# Patient Record
Sex: FEMALE | Race: WHITE
[De-identification: names, ages, dates, MRNs, and addresses within clinical notes are randomized per-mention and may not be internally consistent; named-entity substitution may affect disease eponyms.]

---

## 2022-09-15 ENCOUNTER — HOSPITAL ENCOUNTER (OUTPATIENT)
Dept: HOSPITAL 47 - RADCTMAIN | Age: 47
Discharge: HOME | End: 2022-09-15
Attending: SURGERY
Payer: COMMERCIAL

## 2022-09-15 DIAGNOSIS — K57.30: ICD-10-CM

## 2022-09-15 DIAGNOSIS — K56.41: ICD-10-CM

## 2022-09-15 DIAGNOSIS — K80.20: Primary | ICD-10-CM

## 2022-09-15 PROCEDURE — 74177 CT ABD & PELVIS W/CONTRAST: CPT

## 2022-09-16 NOTE — CT
EXAMINATION TYPE: CT abdomen pelvis w con

 

DATE OF EXAM: 9/15/2022

 

COMPARISON: None

 

HISTORY: DVTRCLI OF LG INT W/O PERF

 

CT DLP: 453.2 mGycm

 

CONTRAST: 

CT scan of the abdomen and pelvis is performed with Oral Contrast and with IV Contrast, patient injec
lior with 100ml mL of Isovue 300.

 

FINDINGS: 

LUNG BASES-: No visible nodule.  No infiltrate. 

 

LIVER/GB:   Calcified gallstones identified.  No space occupying hepatic lesion. Biliary tree is of n
ormal caliber. 

 

PANCREAS:  No inflammation.  No distinct mass. 

 

SPLEEN:  No splenic enlargement.  No lesion seen. 

 

ADRENALS:  No nodule.  No thickening. 

 

KIDNEYS/BLADDER:  No hydronephrosis.  No nephrolithiasis. 4 cm simple cyst lower pole left kidney. Ur
inary bladder grossly unremarkable. 

 

BOWEL: There is nonvisualization of the appendix. No inflammatory process identified. Sigmoid diverti
culosis without diverticulitis. There is at least moderate fecal stasis.  Normal bowel caliber.  No i
nflammation.

 

GENITAL ORGANS:  No gross abnormality. 

 

LYMPH NODES:  No greater than 1cm abdominal or pelvic lymph nodes are appreciated.

 

AORTA: No significant abnormality. 

 

OSSEOUS STRUCTURES:  No significant abnormality is seen. 

 

OTHER:  No significant additional abnormality is seen. 

 

IMPRESSION: 

1. No acute inflammatory process identified.

2. There is at least moderate fecal stasis seen throughout the colon.

3. Sigmoid diverticulosis without diverticulitis.

4. Cholelithiasis.

## 2022-09-29 ENCOUNTER — HOSPITAL ENCOUNTER (OUTPATIENT)
Dept: HOSPITAL 47 - ORWHC2ENDO | Age: 47
Discharge: HOME | End: 2022-09-29
Attending: SURGERY
Payer: COMMERCIAL

## 2022-09-29 VITALS — SYSTOLIC BLOOD PRESSURE: 101 MMHG | HEART RATE: 70 BPM | DIASTOLIC BLOOD PRESSURE: 55 MMHG

## 2022-09-29 VITALS — TEMPERATURE: 97 F | RESPIRATION RATE: 16 BRPM

## 2022-09-29 VITALS — BODY MASS INDEX: 22.3 KG/M2

## 2022-09-29 DIAGNOSIS — K64.0: ICD-10-CM

## 2022-09-29 DIAGNOSIS — Z12.11: Primary | ICD-10-CM

## 2022-09-29 DIAGNOSIS — K57.30: ICD-10-CM

## 2022-09-29 DIAGNOSIS — Z87.891: ICD-10-CM

## 2022-09-29 PROCEDURE — 45378 DIAGNOSTIC COLONOSCOPY: CPT

## 2022-09-29 NOTE — P.PCN
Date of Procedure: 09/29/22


Description of Procedure: 








PREOPERATIVE DIAGNOSIS:


Colonoscopy screening.





POSTOPERATIVE DIAGNOSIS:


Colonoscopy screening.


Severe sigmoid diverticulosis





OPERATION:


Colonoscopy to the cecum, ileocecal valve and appendiceal orifice.





SURGEON: Marry Carbone MD.





ANESTHESIA: MAC.





INDICATIONS:


The patient is a 47-year-old female who presents for colonoscopy screening.  

Benefits and risks were described and informed consent was obtained.





DESCRIPTION OF PROCEDURE:


The patient had undergone Sutab prep.  The patient had been brought into the 

operating room and laid in the left lateral decubitus position. After adequate 

intravenous sedation, the rectum was examined with 2% lidocaine jelly. No 

external hemorrhoids were encountered. The rectal tone was within normal limits.

No lesions were palpated in the rectal vault. An Olympus colonoscope was 

advanced until the cecum, ileocecal valve and appendiceal orifice were clearly 

viewed.  The prep was good.  Severe sigmoid diverticulosis was encountered.  No 

colonic polyps were found.  No evidence of focal colitis was found. Retroflexion

of the scope demonstrated grade 1 internal hemorrhoids without active bleeding 

or inflammation. The colon was desufflated. The patient had tolerated the 

procedure well. 





Withdrawal time was over 6 minutes.





FINDINGS:


Aronchick preparation quality scale 2 (1-5)


Internal hemorrhoids, grade 1


No external prolapsed hemorrhoids.


No arteriovenous malformations.


No adenomatous polyps.


No focal colitis.


Severe sigmoid diverticulosis with redundancy requiring abdominal pressure





RECOMMENDATIONS:


Lower endoscopy in 10 years, 2032





Plan - Discharge Summary


Discharge Rx Participant: No


New Discharge Prescriptions: 


Continue


   Dicyclomine [Bentyl] 20 mg PO BID


   Sennosides/Docusate Sodium [Senna Plus 8.6-50 mg Tablet] 2 each PO DAILY


   Lactulose 30 ml PO DAILY


Discharge Medication List





Dicyclomine [Bentyl] 20 mg PO BID 09/27/22 [History]


Lactulose 30 ml PO DAILY 09/27/22 [History]


Sennosides/Docusate Sodium [Senna Plus 8.6-50 mg Tablet] 2 each PO DAILY 

09/27/22 [History]








Follow up Appointment(s)/Referral(s): 


Marry Carbone MD [STAFF PHYSICIAN] - 10/04/22


Patient Instructions/Handouts:  Diverticulosis Diet (GEN), Diverticulosis (DC)


Activity/Diet/Wound Care/Special Instructions: 


Repeat colonoscopy in 10 years, 2032


Discharge Disposition: HOME SELF-CARE

## 2022-09-29 NOTE — P.GSHP
History of Present Illness


H&P Date: 09/29/22














CHIEF COMPLAINT: Colon screen





HISTORY OF PRESENT ILLNESS: The patient is a 47-year-old female who


presents for colon screen.  Lower endoscopy was offered for further evaluation 

and management.





PAST MEDICAL HISTORY: 


Please see list.





PAST SURGICAL HISTORY: 


Please see list.





MEDICATIONS: 


Please see list.





ALLERGIES:  Please see list. 





SOCIAL HISTORY: No illicit drug use





FAMILY HISTORY: No reports of Crohn disease or ulcerative colitis. 





REVIEW OF ORGAN SYSTEMS: 


CONSTITUTIONAL: No reports of fevers or chills.  





PHYSICAL EXAM: 


VITAL SIGNS:  Stable


GENERAL: Well-developed pleasant in no acute distress. 


HEENT: No scleral icterus. Extraocular movements grossly


intact. Moist buccal mucosa. 


NECK: Supple without lymphadenopathy. 


CHEST: Unlabored respirations. Equal bilateral excursions. 


CARDIOVASCULAR: Regular rate and rhythm. Distal 2+ pulses. 


ABDOMEN: Soft, nontender, nondistended. 


MUSCULOSKELETAL: No clubbing, cyanosis, or edema. 





ASSESSMENT: 


1.  Colon screen.





PLAN: 


1. Recommend proceeding with a lower endoscopy





Past Medical History


Past Medical History: No Reported History


Additional Past Medical History / Comment(s): Left rectal pain X2 months.


History of Any Multi-Drug Resistant Organisms: None Reported


Past Surgical History: Appendectomy, Back Surgery, Bladder Surgery


Additional Past Surgical History / Comment(s): Partial hysterectomy with bladder

suspension, C6-C7 spinal fusion, hand surgery.


Past Anesthesia/Blood Transfusion Reactions: No Reported Reaction


Additional Past Anesthesia/Blood Transfusion Reaction / Comment(s): Had 

difficulty urinating for 2 weeks after partial hysterectomy, had to self cath 

for 2 weeks post-op.


Past Psychological History: No Psychological Hx Reported


Smoking Status: Former smoker


Past Alcohol Use History: Rare


Additional Past Alcohol Use History / Comment(s): Smoked in her 20's.


Past Drug Use History: None Reported





- Past Family History


  ** Mother


Family Medical History: No Reported History





Medications and Allergies


                                Home Medications











 Medication  Instructions  Recorded  Confirmed  Type


 


Dicyclomine [Bentyl] 20 mg PO BID 09/27/22 09/29/22 History


 


Lactulose 30 ml PO DAILY 09/27/22 09/29/22 History


 


Sennosides/Docusate Sodium [Senna 2 each PO DAILY 09/27/22 09/29/22 History





Plus 8.6-50 mg Tablet]    








                                    Allergies











Allergy/AdvReac Type Severity Reaction Status Date / Time


 


No Known Allergies Allergy   Verified 09/29/22 06:54














Surgical - Exam


                                   Vital Signs











Temp Pulse Resp BP Pulse Ox


 


 97.0 F L  86   16   116/71   98 


 


 09/29/22 07:00  09/29/22 07:00  09/29/22 07:00  09/29/22 07:00  09/29/22 07:00

## 2022-10-04 ENCOUNTER — HOSPITAL ENCOUNTER (OUTPATIENT)
Dept: HOSPITAL 47 - RADXRMAIN | Age: 47
Discharge: HOME | End: 2022-10-04
Attending: INTERNAL MEDICINE
Payer: COMMERCIAL

## 2022-10-04 DIAGNOSIS — R07.9: Primary | ICD-10-CM

## 2022-10-04 PROCEDURE — 71046 X-RAY EXAM CHEST 2 VIEWS: CPT

## 2022-10-04 NOTE — XR
EXAMINATION TYPE: XR chest 2V

 

DATE OF EXAM: 10/4/2022

 

COMPARISON: NONE

 

HISTORY: Chest pain unspecified.

 

TECHNIQUE:  Frontal and lateral views of the chest are obtained.

 

FINDINGS:  There is no focal air space opacity, pleural effusion, or pneumothorax seen.  The cardiac 
silhouette size is upper limits of normal. There is dextroconvex scoliosis centered in the mid to low
er thoracic spine.

 

IMPRESSION:  No acute cardiopulmonary process.

## 2023-01-05 ENCOUNTER — HOSPITAL ENCOUNTER (OUTPATIENT)
Dept: HOSPITAL 47 - RADMRIMAIN | Age: 48
Discharge: HOME | End: 2023-01-05
Attending: NURSE PRACTITIONER
Payer: COMMERCIAL

## 2023-01-05 DIAGNOSIS — R20.2: ICD-10-CM

## 2023-01-05 DIAGNOSIS — G93.89: Primary | ICD-10-CM

## 2023-01-05 PROCEDURE — 70553 MRI BRAIN STEM W/O & W/DYE: CPT

## 2023-01-05 NOTE — MR
EXAMINATION TYPE: MR brain wo/w con

 

DATE OF EXAM: 1/5/2023

 

COMPARISON: NONE

 

HISTORY: No prior, headaches, fatigue, memory issues

 

TECHNIQUE: 

Multiplanar, multisequence images of the brain and brainstem is performed without and with IV contras
t, utilizing 6ml mL intravenous Gadavist .

 

FINDINGS: Diffusion weighted images demonstrate no evidence of a recent infarct or other diffusion ab
normality. The ventricular system and cisternal spaces are normal in size and appearance.  The brain 
volume is age appropriate. A few tiny scattered foci of T2 hyperintensity are seen throughout the whi
te matter bilaterally. Approximately 10-15 lesions are seen predominantly in the right frontal lobe. 
Lesions are nonspecific in appearance and distribution. T2 Star weighted images show no suspicious in
traparenchymal blood product.

 

Midline structures demonstrate normal morphology.  The craniocervical junction appears within normal 
limits.  Post contrast images demonstrate no abnormal enhancement. The dural venous sinuses appear pa
tent. The visualized sinuses are clear and the globes are intact. Nasal septum is deviated to left of
 midline.

 

IMPRESSION: Mild to minimal nonspecific white matter changes favor result of altered vascular mechani
cs related to product of migraine headaches.